# Patient Record
Sex: FEMALE | Race: WHITE | NOT HISPANIC OR LATINO | Employment: OTHER
[De-identification: names, ages, dates, MRNs, and addresses within clinical notes are randomized per-mention and may not be internally consistent; named-entity substitution may affect disease eponyms.]

---

## 2021-03-12 NOTE — PATIENT DISCUSSION
- Agree with starting [facility] OU. Provided with sample.  Has another sample at home

## 2021-10-08 ENCOUNTER — PREPPED CHART (OUTPATIENT)
Age: 71
End: 2021-10-08

## 2021-10-12 ENCOUNTER — ESTABLISHED COMPREHENSIVE EXAM (OUTPATIENT)
Age: 71
End: 2021-10-12

## 2021-10-12 DIAGNOSIS — H43.811: ICD-10-CM

## 2021-10-12 DIAGNOSIS — H25.813: ICD-10-CM

## 2021-10-12 PROCEDURE — 92014 COMPRE OPH EXAM EST PT 1/>: CPT

## 2021-10-12 PROCEDURE — 92015 DETERMINE REFRACTIVE STATE: CPT

## 2021-10-12 ASSESSMENT — VISUAL ACUITY
OS_CC: 20/20-2
OD_CC: 20/20

## 2021-10-12 ASSESSMENT — TONOMETRY
OD_IOP_MMHG: 16
OS_IOP_MMHG: 16

## 2022-01-13 NOTE — PATIENT DISCUSSION
Borderline visually significant. Patient elects to observe for now. Continue with same glasses for now.

## 2022-05-04 NOTE — PATIENT DISCUSSION
- HVF shows: mildly unreliable due to FLs. 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 Consider repeating in near future.

## 2022-07-09 ENCOUNTER — TELEPHONE ENCOUNTER (OUTPATIENT)
Dept: URBAN - METROPOLITAN AREA CLINIC 121 | Facility: CLINIC | Age: 72
End: 2022-07-09

## 2022-07-09 RX ORDER — BISMUTH SUBSALICYLATE 525 MG/1
TABLET ORAL
Refills: 0 | OUTPATIENT
Start: 2011-08-25 | End: 2013-07-19

## 2022-07-09 RX ORDER — OMEPRAZOLE 40 MG/1
CAPSULE, DELAYED RELEASE ORAL
Refills: 0 | OUTPATIENT
Start: 2011-08-25 | End: 2013-07-19

## 2022-07-09 RX ORDER — RALOXIFENE HCL 60 MG
TABLET ORAL
Refills: 0 | OUTPATIENT
Start: 2011-09-28 | End: 2013-07-19

## 2022-07-09 RX ORDER — OMEPRAZOLE 40 MG/1
CAPSULE, DELAYED RELEASE ORAL
Refills: 0 | OUTPATIENT
Start: 2013-06-02 | End: 2018-09-21

## 2022-07-09 RX ORDER — OMEGA-3S/DHA/EPA/FISH OIL 120-180-60
CAPSULE,DELAYED RELEASE (ENTERIC COATED) ORAL
Refills: 0 | OUTPATIENT
Start: 2011-08-25 | End: 2013-07-19

## 2022-07-10 ENCOUNTER — TELEPHONE ENCOUNTER (OUTPATIENT)
Dept: URBAN - METROPOLITAN AREA CLINIC 121 | Facility: CLINIC | Age: 72
End: 2022-07-10

## 2022-07-10 RX ORDER — BISMUTH SUBSALICYLATE 525 MG/1
TABLET ORAL
Refills: 0 | Status: ACTIVE | COMMUNITY
Start: 2013-07-19

## 2022-07-10 RX ORDER — OMEGA-3S/DHA/EPA/FISH OIL 120-180-60
CAPSULE,DELAYED RELEASE (ENTERIC COATED) ORAL
Refills: 0 | Status: ACTIVE | COMMUNITY
Start: 2013-07-19

## 2022-07-10 RX ORDER — FLUCONAZOLE 100 MG/1
TAKE TWO TABLETS ON FIRST DAY THEN DAILY FOR 9 DAYS TABLET ORAL
Refills: 0 | Status: ACTIVE | COMMUNITY
Start: 2018-12-13

## 2022-07-10 RX ORDER — GLUCOSAMINE/MSM/CHONDROIT SULF 500-166.6
TABLET ORAL
Refills: 0 | Status: ACTIVE | COMMUNITY
Start: 2018-09-21

## 2022-07-10 RX ORDER — CALCIUM CARBONATE/VITAMIN D3 600 MG-10
CAPSULE ORAL
Refills: 0 | Status: ACTIVE | COMMUNITY
Start: 2013-07-19

## 2022-07-10 RX ORDER — ANTIARTHRITIC COMBINATION NO.2 900 MG
TABLET ORAL
Refills: 0 | Status: ACTIVE | COMMUNITY
Start: 2013-07-19

## 2022-08-19 ENCOUNTER — LAB OUTSIDE AN ENCOUNTER (OUTPATIENT)
Dept: URBAN - METROPOLITAN AREA CLINIC 63 | Facility: CLINIC | Age: 72
End: 2022-08-19

## 2022-08-19 ENCOUNTER — OFFICE VISIT (OUTPATIENT)
Dept: URBAN - METROPOLITAN AREA CLINIC 63 | Facility: CLINIC | Age: 72
End: 2022-08-19
Payer: MEDICARE

## 2022-08-19 ENCOUNTER — WEB ENCOUNTER (OUTPATIENT)
Dept: URBAN - METROPOLITAN AREA CLINIC 63 | Facility: CLINIC | Age: 72
End: 2022-08-19

## 2022-08-19 VITALS
DIASTOLIC BLOOD PRESSURE: 60 MMHG | SYSTOLIC BLOOD PRESSURE: 110 MMHG | BODY MASS INDEX: 21.24 KG/M2 | HEIGHT: 65 IN | OXYGEN SATURATION: 96 % | HEART RATE: 62 BPM | TEMPERATURE: 98.4 F | WEIGHT: 127.5 LBS

## 2022-08-19 DIAGNOSIS — D64.9 NORMOCYTIC ANEMIA: ICD-10-CM

## 2022-08-19 DIAGNOSIS — K76.0 FATTY (CHANGE OF) LIVER: ICD-10-CM

## 2022-08-19 DIAGNOSIS — K80.20 CHOLELITHIASES: ICD-10-CM

## 2022-08-19 DIAGNOSIS — K63.5 COLON POLYPS: ICD-10-CM

## 2022-08-19 DIAGNOSIS — R07.9 CHEST PAIN: ICD-10-CM

## 2022-08-19 DIAGNOSIS — K21.9 GERD: ICD-10-CM

## 2022-08-19 PROCEDURE — 99204 OFFICE O/P NEW MOD 45 MIN: CPT | Performed by: INTERNAL MEDICINE

## 2022-08-19 RX ORDER — BISMUTH SUBSALICYLATE 525 MG/1
TABLET ORAL
Refills: 0 | Status: ACTIVE | COMMUNITY
Start: 2013-07-19

## 2022-08-19 RX ORDER — MULTIVIT-MIN/IRON/FOLIC ACID/K 18-600-40
AS DIRECTED CAPSULE ORAL
Status: ACTIVE | COMMUNITY

## 2022-08-19 RX ORDER — GLUCOSAMINE/MSM/CHONDROIT SULF 500-166.6
TABLET ORAL
Refills: 0 | Status: ACTIVE | COMMUNITY
Start: 2018-09-21

## 2022-08-19 RX ORDER — ANTIARTHRITIC COMBINATION NO.2 900 MG
TABLET ORAL
Refills: 0 | Status: ACTIVE | COMMUNITY
Start: 2013-07-19

## 2022-08-19 RX ORDER — OMEGA-3S/DHA/EPA/FISH OIL 120-180-60
CAPSULE,DELAYED RELEASE (ENTERIC COATED) ORAL
Refills: 0 | Status: ACTIVE | COMMUNITY
Start: 2013-07-19

## 2022-08-19 RX ORDER — CALCIUM CARBONATE/VITAMIN D3 600 MG-10
CAPSULE ORAL
Refills: 0 | Status: ACTIVE | COMMUNITY
Start: 2013-07-19

## 2022-08-19 RX ORDER — FAMOTIDINE 40 MG/1
1 TABLET AT BEDTIME TABLET, FILM COATED ORAL TWICE A DAY
Status: ACTIVE | COMMUNITY

## 2022-08-19 NOTE — PHYSICAL EXAM GASTROINTESTINAL
soft, nontender, nondistended , no masses palpable , normal bowel sounds , mild tenderness ruq / no hepatomegaly

## 2022-08-19 NOTE — HPI-TODAY'S VISIT:
Sera is here today in the office for evaluation of right-sided chest pain.  She has a history of chronic reflux and has been taking ranitidine in the past and now switched to famotidine 40 mg.  Since July she has been experience some right-sided chest pain.  She had a mammogram done that was unremarkable.  Her primary care physician Dr. Tiara Castano placed her on pantoprazole 20 mg which did not relieve her symptoms.  She exercises on a regular basis.  Denies use of NSAIDs.  No rectal bleeding or melena. Symptoms are precipitated following a meal, associated with burping gas and sometimes flatulence.  She has regular bowel movements on align.  Weight has been stable.  No loss of appetite. Her ultrasound in 2011 noted hepatomegaly and gallstones.

## 2022-08-19 NOTE — HPI-PREVIOUS LABS
Labs July 2022: WBC 4.3, hemoglobin 12.9 g, MCV 91, platelets 225, BUN 15 creatinine 0.62, normal electrolytes, serum albumin 4.4, normal liver enzymes, hemoglobin A1c 5.6, normal lipid panel, TSH is normal, vitamin D 96, Labs 2018: Normal iron B12 folate levels, hemoglobin 11.3 g MCV 89, platelets 354

## 2022-08-19 NOTE — HPI-PREVIOUS PROCEDURES
Upper endoscopy 2018: Endoscopic suggestion of short segment Quezada's esophagus with biopsies negative for Quezada's, findings also suspicious for EOE-biopsies came back showing Gia, 3 cm hiatal hernia, normal stomach, normal duodenum 2 colonoscopies in 2018-initial 1 had poor prep with magnesium citrate and subsequently had another colonoscopy with Dr. Weaver-6 mm transverse colon adenoma, hyperplastic rectal polyp, left-sided diverticulosis and grade 2 hemorrhoids  EGD 2011: Mild esophagitis biopsies negative for Quezada's, H. pylori negative gastritis, 2 cm hiatal hernia, unremarkable duodenal biopsies Colonoscopy 2011: Tortuous colon, mild sigmoid diverticulosis and hemorrhoids

## 2022-08-25 ENCOUNTER — OFFICE VISIT (OUTPATIENT)
Dept: URBAN - METROPOLITAN AREA SURGERY CENTER 4 | Facility: SURGERY CENTER | Age: 72
End: 2022-08-25
Payer: MEDICARE

## 2022-08-25 ENCOUNTER — CLAIMS CREATED FROM THE CLAIM WINDOW (OUTPATIENT)
Dept: URBAN - METROPOLITAN AREA CLINIC 4 | Facility: CLINIC | Age: 72
End: 2022-08-25
Payer: MEDICARE

## 2022-08-25 DIAGNOSIS — K21.9 GASTRO-ESOPHAGEAL REFLUX DISEASE WITHOUT ESOPHAGITIS: ICD-10-CM

## 2022-08-25 DIAGNOSIS — K21.9 GERD: ICD-10-CM

## 2022-08-25 DIAGNOSIS — R19.8 OTHER SPECIFIED SYMPTOMS AND SIGNS INVOLVING THE DIGESTIVE SYSTEM AND ABDOMEN: ICD-10-CM

## 2022-08-25 DIAGNOSIS — K44.9 DIAPHRAGMATIC HERNIA WITHOUT OBSTRUCTION OR GANGRENE: ICD-10-CM

## 2022-08-25 DIAGNOSIS — K22.70 BARRETT ESOPHAGUS: ICD-10-CM

## 2022-08-25 PROCEDURE — 88305 TISSUE EXAM BY PATHOLOGIST: CPT | Performed by: PATHOLOGY

## 2022-08-25 PROCEDURE — G8907 PT DOC NO EVENTS ON DISCHARG: HCPCS | Performed by: INTERNAL MEDICINE

## 2022-08-25 PROCEDURE — 43239 EGD BIOPSY SINGLE/MULTIPLE: CPT | Performed by: INTERNAL MEDICINE

## 2022-08-25 PROCEDURE — G8918 PT W/O PREOP ORDER IV AB PRO: HCPCS | Performed by: INTERNAL MEDICINE

## 2022-08-25 RX ORDER — FAMOTIDINE 40 MG/1
1 TABLET AT BEDTIME TABLET, FILM COATED ORAL TWICE A DAY
Status: ACTIVE | COMMUNITY

## 2022-08-25 RX ORDER — CALCIUM CARBONATE/VITAMIN D3 600 MG-10
CAPSULE ORAL
Refills: 0 | Status: ACTIVE | COMMUNITY
Start: 2013-07-19

## 2022-08-25 RX ORDER — BISMUTH SUBSALICYLATE 525 MG/1
TABLET ORAL
Refills: 0 | Status: ACTIVE | COMMUNITY
Start: 2013-07-19

## 2022-08-25 RX ORDER — ANTIARTHRITIC COMBINATION NO.2 900 MG
TABLET ORAL
Refills: 0 | Status: ACTIVE | COMMUNITY
Start: 2013-07-19

## 2022-08-25 RX ORDER — MULTIVIT-MIN/IRON/FOLIC ACID/K 18-600-40
AS DIRECTED CAPSULE ORAL
Status: ACTIVE | COMMUNITY

## 2022-08-25 RX ORDER — OMEGA-3S/DHA/EPA/FISH OIL 120-180-60
CAPSULE,DELAYED RELEASE (ENTERIC COATED) ORAL
Refills: 0 | Status: ACTIVE | COMMUNITY
Start: 2013-07-19

## 2022-08-25 RX ORDER — GLUCOSAMINE/MSM/CHONDROIT SULF 500-166.6
TABLET ORAL
Refills: 0 | Status: ACTIVE | COMMUNITY
Start: 2018-09-21

## 2022-08-26 ENCOUNTER — LAB OUTSIDE AN ENCOUNTER (OUTPATIENT)
Dept: URBAN - METROPOLITAN AREA CLINIC 63 | Facility: CLINIC | Age: 72
End: 2022-08-26

## 2022-08-26 ENCOUNTER — WEB ENCOUNTER (OUTPATIENT)
Dept: URBAN - METROPOLITAN AREA CLINIC 63 | Facility: CLINIC | Age: 72
End: 2022-08-26

## 2022-08-26 PROBLEM — 302914006 BARRETT ESOPHAGUS: Status: ACTIVE | Noted: 2022-08-26

## 2022-08-26 RX ORDER — OMEPRAZOLE 40 MG/1
1 CAPSULE 30 MINUTES BEFORE MORNING MEAL CAPSULE, DELAYED RELEASE ORAL ONCE A DAY
Qty: 90 | Refills: 1 | OUTPATIENT
Start: 2022-08-26

## 2022-09-08 ENCOUNTER — OFFICE VISIT (OUTPATIENT)
Dept: URBAN - METROPOLITAN AREA TELEHEALTH 1 | Facility: TELEHEALTH | Age: 72
End: 2022-09-08
Payer: MEDICARE

## 2022-09-08 DIAGNOSIS — R07.9 CHEST PAIN: ICD-10-CM

## 2022-09-08 DIAGNOSIS — K21.9 GERD: ICD-10-CM

## 2022-09-08 DIAGNOSIS — K63.5 COLON POLYPS: ICD-10-CM

## 2022-09-08 DIAGNOSIS — K76.0 FATTY (CHANGE OF) LIVER: ICD-10-CM

## 2022-09-08 DIAGNOSIS — K80.20 CHOLELITHIASES: ICD-10-CM

## 2022-09-08 DIAGNOSIS — D64.9 NORMOCYTIC ANEMIA: ICD-10-CM

## 2022-09-08 PROBLEM — 266474003 CHOLELITHIASIS: Status: ACTIVE | Noted: 2022-08-19

## 2022-09-08 PROBLEM — 197321007 FATTY LIVER: Status: ACTIVE | Noted: 2022-08-19

## 2022-09-08 PROBLEM — 235595009 GASTROESOPHAGEAL REFLUX DISEASE: Status: ACTIVE | Noted: 2022-08-19

## 2022-09-08 PROCEDURE — 99213 OFFICE O/P EST LOW 20 MIN: CPT | Performed by: INTERNAL MEDICINE

## 2022-09-08 RX ORDER — FAMOTIDINE 40 MG/1
1 TABLET AT BEDTIME TABLET, FILM COATED ORAL TWICE A DAY
Status: ACTIVE | COMMUNITY

## 2022-09-08 RX ORDER — CALCIUM CARBONATE/VITAMIN D3 600 MG-10
CAPSULE ORAL
Refills: 0 | Status: ACTIVE | COMMUNITY
Start: 2013-07-19

## 2022-09-08 RX ORDER — OMEPRAZOLE 40 MG/1
1 CAPSULE 30 MINUTES BEFORE MORNING MEAL CAPSULE, DELAYED RELEASE ORAL ONCE A DAY
Qty: 90 | Refills: 1 | Status: ACTIVE | COMMUNITY
Start: 2022-08-26

## 2022-09-08 RX ORDER — BISMUTH SUBSALICYLATE 525 MG/1
TABLET ORAL
Refills: 0 | Status: ACTIVE | COMMUNITY
Start: 2013-07-19

## 2022-09-08 RX ORDER — GLUCOSAMINE/MSM/CHONDROIT SULF 500-166.6
TABLET ORAL
Refills: 0 | Status: ACTIVE | COMMUNITY
Start: 2018-09-21

## 2022-09-08 RX ORDER — MULTIVIT-MIN/IRON/FOLIC ACID/K 18-600-40
AS DIRECTED CAPSULE ORAL
Status: ACTIVE | COMMUNITY

## 2022-09-08 RX ORDER — ANTIARTHRITIC COMBINATION NO.2 900 MG
TABLET ORAL
Refills: 0 | Status: ACTIVE | COMMUNITY
Start: 2013-07-19

## 2022-09-08 RX ORDER — OMEGA-3S/DHA/EPA/FISH OIL 120-180-60
CAPSULE,DELAYED RELEASE (ENTERIC COATED) ORAL
Refills: 0 | Status: ACTIVE | COMMUNITY
Start: 2013-07-19

## 2022-09-08 NOTE — HPI-PREVIOUS IMAGING
Ultrasound August 2022: Gallstones present, normal ducts, normal liver (homogeneous echogenicity), no focal lesions, normal spleen Ultrasound 2011: Mild hepatomegaly, cholelithiasis, normal ducts,

## 2022-09-08 NOTE — HPI-TODAY'S VISIT:
Sera is being seen today on telehealth following her upper endoscopy that was done for acid reflux and chest pain.  She is currently taking omeprazole 40 mg in the morning and famotidine 40 mg at bedtime.  This seems to be helping her acid reflux and chest pain symptoms significantly.  She reports no dysphagia abdominal pain or nausea.  Denies any early satiety or bloating.  Denies any constipation diarrhea rectal bleeding or melena.  Denies any unintentional weight loss loss of appetite.  She exercises on a regular basis.  Denies use of NSAIDs.   Her colonoscopy is due in 2023. Ultrasound noted gallstones but otherwise unremarkable-done in August 2022.

## 2022-09-08 NOTE — HPI-PREVIOUS PROCEDURES
Upper endoscopy August 2022: LA grade a esophagitis-C1 M2 but pathology showed no evidence of eosinophilic esophagitis or Quezada's.  3 cm hiatal hernia.  Nonobstructing Schatzki's ring.  Normal stomach and normal duodenum-no biopsies taken in the stomach or duodenum.  Upper endoscopy 2018: Endoscopic suggestion of short segment Quezada's esophagus with biopsies negative for Quezada's, findings also suspicious for EOE-biopsies came back showing Gia, 3 cm hiatal hernia, normal stomach, normal duodenum 2 colonoscopies in 2018-initial 1 had poor prep with magnesium citrate and subsequently had another colonoscopy with Dr. Weaver-6 mm transverse colon adenoma, hyperplastic rectal polyp, left-sided diverticulosis and grade 2 hemorrhoids  EGD 2011: Mild esophagitis biopsies negative for Quezada's, H. pylori negative gastritis, 2 cm hiatal hernia, unremarkable duodenal biopsies Colonoscopy 2011: Tortuous colon, mild sigmoid diverticulosis and hemorrhoids

## 2022-12-12 NOTE — PATIENT DISCUSSION
HVF shows: mildly unreliable due to FLs. 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 Consider repeating in near future.

## 2022-12-28 ENCOUNTER — COMPREHENSIVE EXAM (OUTPATIENT)
Dept: URBAN - METROPOLITAN AREA CLINIC 27 | Facility: CLINIC | Age: 72
End: 2022-12-28

## 2022-12-28 PROCEDURE — 92015 DETERMINE REFRACTIVE STATE: CPT

## 2022-12-28 PROCEDURE — 92014 COMPRE OPH EXAM EST PT 1/>: CPT

## 2022-12-28 ASSESSMENT — TONOMETRY
OS_IOP_MMHG: 16
OD_IOP_MMHG: 14

## 2022-12-28 ASSESSMENT — VISUAL ACUITY
OS_CC: 20/20
OD_CC: 20/20-2
OD_CC: J3-1
OS_CC: J2-1

## 2022-12-28 ASSESSMENT — KERATOMETRY
OS_AXISANGLE2_DEGREES: 95
OD_AXISANGLE2_DEGREES: 92
OD_K2POWER_DIOPTERS: 44.50
OS_K2POWER_DIOPTERS: 44.75
OS_AXISANGLE_DEGREES: 5
OS_K1POWER_DIOPTERS: 44.25
OD_K1POWER_DIOPTERS: 44.00
OD_AXISANGLE_DEGREES: 2

## 2023-02-10 ENCOUNTER — ERX REFILL RESPONSE (OUTPATIENT)
Dept: URBAN - METROPOLITAN AREA CLINIC 63 | Facility: CLINIC | Age: 73
End: 2023-02-10

## 2023-02-10 RX ORDER — OMEPRAZOLE 40 MG/1
TAKE 1 CAPSULE BY MOUTH EVERY DAY 30 MINUTES BEFORE MORNING MEAL FOR 90 DAYS CAPSULE, DELAYED RELEASE ORAL
Qty: 90 CAPSULE | Refills: 0 | OUTPATIENT

## 2023-02-10 RX ORDER — OMEPRAZOLE 40 MG/1
1 CAPSULE 30 MINUTES BEFORE MORNING MEAL CAPSULE, DELAYED RELEASE ORAL ONCE A DAY
Qty: 90 | Refills: 1 | OUTPATIENT

## 2023-05-17 ENCOUNTER — WEB ENCOUNTER (OUTPATIENT)
Dept: URBAN - METROPOLITAN AREA CLINIC 63 | Facility: CLINIC | Age: 73
End: 2023-05-17

## 2023-05-17 RX ORDER — OMEPRAZOLE 40 MG/1
TAKE 1 CAPSULE BY MOUTH EVERY DAY 30 MINUTES BEFORE MORNING MEAL FOR 90 DAYS CAPSULE, DELAYED RELEASE ORAL ONCE A DAY
Qty: 90 CAPSULE | Refills: 1

## 2023-09-01 ENCOUNTER — WEB ENCOUNTER (OUTPATIENT)
Dept: URBAN - METROPOLITAN AREA CLINIC 63 | Facility: CLINIC | Age: 73
End: 2023-09-01

## 2023-09-07 ENCOUNTER — OFFICE VISIT (OUTPATIENT)
Dept: URBAN - METROPOLITAN AREA CLINIC 63 | Facility: CLINIC | Age: 73
End: 2023-09-07
Payer: MEDICARE

## 2023-09-07 VITALS
WEIGHT: 118.2 LBS | BODY MASS INDEX: 19.69 KG/M2 | DIASTOLIC BLOOD PRESSURE: 84 MMHG | TEMPERATURE: 97.4 F | HEART RATE: 65 BPM | SYSTOLIC BLOOD PRESSURE: 128 MMHG | HEIGHT: 65 IN | OXYGEN SATURATION: 97 %

## 2023-09-07 DIAGNOSIS — K63.5 COLON POLYPS: ICD-10-CM

## 2023-09-07 DIAGNOSIS — K80.20 CHOLELITHIASES: ICD-10-CM

## 2023-09-07 DIAGNOSIS — R07.9 CHEST PAIN: ICD-10-CM

## 2023-09-07 DIAGNOSIS — K76.0 FATTY (CHANGE OF) LIVER: ICD-10-CM

## 2023-09-07 DIAGNOSIS — K21.9 GERD: ICD-10-CM

## 2023-09-07 DIAGNOSIS — D64.9 NORMOCYTIC ANEMIA: ICD-10-CM

## 2023-09-07 PROCEDURE — 99214 OFFICE O/P EST MOD 30 MIN: CPT | Performed by: INTERNAL MEDICINE

## 2023-09-07 RX ORDER — OMEPRAZOLE 40 MG/1
TAKE 1 CAPSULE BY MOUTH EVERY DAY 30 MINUTES BEFORE MORNING MEAL FOR 90 DAYS CAPSULE, DELAYED RELEASE ORAL ONCE A DAY
Qty: 90 CAPSULE | Refills: 1 | Status: ACTIVE | COMMUNITY

## 2023-09-07 RX ORDER — BISMUTH SUBSALICYLATE 525 MG/1
TABLET ORAL
Refills: 0 | Status: ACTIVE | COMMUNITY
Start: 2013-07-19

## 2023-09-07 RX ORDER — CALCIUM CARBONATE/VITAMIN D3 600 MG-10
CAPSULE ORAL
Refills: 0 | Status: ACTIVE | COMMUNITY
Start: 2013-07-19

## 2023-09-07 RX ORDER — ANTIARTHRITIC COMBINATION NO.2 900 MG
TABLET ORAL
Refills: 0 | Status: ACTIVE | COMMUNITY
Start: 2013-07-19

## 2023-09-07 RX ORDER — GLUCOSAMINE/MSM/CHONDROIT SULF 500-166.6
TABLET ORAL
Refills: 0 | Status: ACTIVE | COMMUNITY
Start: 2018-09-21

## 2023-09-07 RX ORDER — MULTIVIT-MIN/IRON/FOLIC ACID/K 18-600-40
AS DIRECTED CAPSULE ORAL
Status: ACTIVE | COMMUNITY

## 2023-09-07 RX ORDER — OMEGA-3S/DHA/EPA/FISH OIL 120-180-60
CAPSULE,DELAYED RELEASE (ENTERIC COATED) ORAL
Refills: 0 | Status: ACTIVE | COMMUNITY
Start: 2013-07-19

## 2023-09-07 NOTE — HPI-PREVIOUS PROCEDURES
Upper endoscopy August 2022: LA grade a esophagitis-C1 M2 but pathology showed no evidence of eosinophilic esophagitis or Quezada's.  3 cm hiatal hernia.  Nonobstructing Schatzki's ring.  Normal stomach and normal duodenum-no biopsies taken in the stomach or duodenum.  Upper endoscopy 2018: Endoscopic suggestion of short segment Quezada's esophagus with biopsies negative for Quezada's, findings also suspicious for EOE-biopsies came back showing Gia, 3 cm hiatal hernia, normal stomach, normal duodenum 2 colonoscopies in 2018-initial 1 had poor prep with magnesium citrate and subsequently had another colonoscopy with Dr. Rizzo-6 mm transverse colon adenoma, hyperplastic rectal polyp, left-sided diverticulosis and grade 2 hemorrhoids  EGD 2011: Mild esophagitis biopsies negative for Quezada's, H. pylori negative gastritis, 2 cm hiatal hernia, unremarkable duodenal biopsies colonoscopy 2018 - dr rizzo small transverse colon adenoma , diverticulosis, hemorrhoids Colonoscopy 2011: Tortuous colon, mild sigmoid diverticulosis and hemorrhoids

## 2023-09-07 NOTE — HPI-TODAY'S VISIT:
Sera is here today in follow-up. Since her last visit in 2022 she underwent a cholecystectomy this may after her gallbladder ultrasound noted signs of cholecystitis apparently per her report.  She is doing well after her cholecystectomy. She denies any significant reflux.  She takes omeprazole 40 mg and famotidine 40 mg.  I recommended she talk to stop omeprazole 40 mg and continue with famotidine.  She denies any dysphagia.  Reports regular bowel movements although she has small bowel movements during the day after her regular morning bowel movement.  She denies any rectal bleeding or melena.  She takes align. Last colonoscopy was done by Dr. Weaver in 2018 and she had a diminutive transverse colon adenoma.  Colonoscopy was described as being difficult due to restrictive colon mobility..

## 2023-09-13 ENCOUNTER — WEB ENCOUNTER (OUTPATIENT)
Dept: URBAN - METROPOLITAN AREA CLINIC 63 | Facility: CLINIC | Age: 73
End: 2023-09-13

## 2023-11-26 ENCOUNTER — ERX REFILL RESPONSE (OUTPATIENT)
Dept: URBAN - METROPOLITAN AREA CLINIC 63 | Facility: CLINIC | Age: 73
End: 2023-11-26

## 2023-11-26 RX ORDER — OMEPRAZOLE 40 MG/1
TAKE 1 CAPSULE BY MOUTH EVERY DAY 30 MINUTES BEFORE MORNING MEAL FOR 90 DAYS ORALLY ONCE A DAY 90 DAYS CAPSULE, DELAYED RELEASE ORAL
Qty: 90 CAPSULE | Refills: 1 | OUTPATIENT

## 2023-11-26 RX ORDER — OMEPRAZOLE 40 MG/1
TAKE 1 CAPSULE BY MOUTH EVERY DAY 30 MINUTES BEFORE MORNING MEAL FOR 90 DAYS CAPSULE, DELAYED RELEASE ORAL ONCE A DAY
Qty: 90 CAPSULE | Refills: 1 | OUTPATIENT

## 2023-12-14 ENCOUNTER — OFFICE VISIT (OUTPATIENT)
Dept: URBAN - METROPOLITAN AREA CLINIC 63 | Facility: CLINIC | Age: 73
End: 2023-12-14

## 2023-12-14 ENCOUNTER — OFFICE VISIT (OUTPATIENT)
Dept: URBAN - METROPOLITAN AREA CLINIC 63 | Facility: CLINIC | Age: 73
End: 2023-12-14
Payer: MEDICARE

## 2023-12-14 ENCOUNTER — DASHBOARD ENCOUNTERS (OUTPATIENT)
Age: 73
End: 2023-12-14

## 2023-12-14 VITALS
DIASTOLIC BLOOD PRESSURE: 80 MMHG | SYSTOLIC BLOOD PRESSURE: 120 MMHG | HEIGHT: 65 IN | HEART RATE: 65 BPM | BODY MASS INDEX: 20.16 KG/M2 | WEIGHT: 121 LBS | TEMPERATURE: 97.5 F | OXYGEN SATURATION: 97 %

## 2023-12-14 DIAGNOSIS — K63.5 COLON POLYPS: ICD-10-CM

## 2023-12-14 DIAGNOSIS — K21.9 GERD: ICD-10-CM

## 2023-12-14 DIAGNOSIS — K76.0 FATTY (CHANGE OF) LIVER: ICD-10-CM

## 2023-12-14 DIAGNOSIS — R07.9 CHEST PAIN: ICD-10-CM

## 2023-12-14 PROCEDURE — 99214 OFFICE O/P EST MOD 30 MIN: CPT | Performed by: INTERNAL MEDICINE

## 2023-12-14 RX ORDER — MULTIVIT-MIN/IRON/FOLIC ACID/K 18-600-40
AS DIRECTED CAPSULE ORAL
Status: ACTIVE | COMMUNITY

## 2023-12-14 RX ORDER — GLUCOSAMINE/MSM/CHONDROIT SULF 500-166.6
TABLET ORAL
Refills: 0 | Status: ACTIVE | COMMUNITY
Start: 2018-09-21

## 2023-12-14 RX ORDER — BISMUTH SUBSALICYLATE 525 MG/1
TABLET ORAL
Refills: 0 | Status: ACTIVE | COMMUNITY
Start: 2013-07-19

## 2023-12-14 RX ORDER — CALCIUM CARBONATE/VITAMIN D3 600 MG-10
CAPSULE ORAL
Refills: 0 | Status: ACTIVE | COMMUNITY
Start: 2013-07-19

## 2023-12-14 RX ORDER — OMEGA-3S/DHA/EPA/FISH OIL 120-180-60
CAPSULE,DELAYED RELEASE (ENTERIC COATED) ORAL
Refills: 0 | Status: ACTIVE | COMMUNITY
Start: 2013-07-19

## 2023-12-14 RX ORDER — ANTIARTHRITIC COMBINATION NO.2 900 MG
TABLET ORAL
Refills: 0 | Status: ACTIVE | COMMUNITY
Start: 2013-07-19

## 2023-12-14 NOTE — HPI-TODAY'S VISIT:
Sera is here today in follow-up. She remains asymptomatic. She reports no reflux and wants to see if she can still needs to take omeprazole and famotidine. She stopped both her medicines. I recommended she go back on famotidine due to her history of hiatal hernia. Cologuard test ordered in September 2023 came back negative. Colonoscopy in 2018 did not show any significant findings. She is asymptomatic. Denies reflux symptoms or dysphagia. Reports no abdominal pain or nausea. Denies constipation diarrhea rectal bleeding or melena and denies any unintentional weight loss loss of appetite..   Since her last visit in 2022 she underwent a cholecystectomy this may (2023) after her gallbladder ultrasound noted signs of cholecystitis apparently per her report.  She is doing well after her cholecystectomy. She takes align. Last colonoscopy was done by Dr. Weaver in 2018 and she had a diminutive transverse colon adenoma.  Colonoscopy was described as being difficult due to restrictive colon mobility..

## 2023-12-14 NOTE — HPI-PREVIOUS PROCEDURES
COLOGUARD SEPT 2023 NEG  Upper endoscopy August 2022: LA grade a esophagitis-C1 M2 but pathology showed no evidence of eosinophilic esophagitis or Quezada's.  3 cm hiatal hernia.  Nonobstructing Schatzki's ring.  Normal stomach and normal duodenum-no biopsies taken in the stomach or duodenum.  Upper endoscopy 2018: Endoscopic suggestion of short segment Quezada's esophagus with biopsies negative for Quezada's, findings also suspicious for EOE-biopsies came back showing Gia, 3 cm hiatal hernia, normal stomach, normal duodenum 2 colonoscopies in 2018-initial 1 had poor prep with magnesium citrate and subsequently had another colonoscopy with Dr. Rizzo-6 mm transverse colon adenoma, hyperplastic rectal polyp, left-sided diverticulosis and grade 2 hemorrhoids  EGD 2011: Mild esophagitis biopsies negative for Quezada's, H. pylori negative gastritis, 2 cm hiatal hernia, unremarkable duodenal biopsies colonoscopy 2018 - dr rizzo small transverse colon adenoma , diverticulosis, hemorrhoids Colonoscopy 2011: Tortuous colon, mild sigmoid diverticulosis and hemorrhoids

## 2024-01-18 ENCOUNTER — COMPREHENSIVE EXAM (OUTPATIENT)
Dept: URBAN - METROPOLITAN AREA CLINIC 27 | Facility: CLINIC | Age: 74
End: 2024-01-18

## 2024-01-18 DIAGNOSIS — H43.811: ICD-10-CM

## 2024-01-18 DIAGNOSIS — H25.813: ICD-10-CM

## 2024-01-18 DIAGNOSIS — H52.203: ICD-10-CM

## 2024-01-18 DIAGNOSIS — H04.123: ICD-10-CM

## 2024-01-18 PROCEDURE — 99214 OFFICE O/P EST MOD 30 MIN: CPT

## 2024-01-18 PROCEDURE — 92015 DETERMINE REFRACTIVE STATE: CPT

## 2024-01-18 ASSESSMENT — KERATOMETRY
OD_AXISANGLE_DEGREES: 2
OS_K1POWER_DIOPTERS: 44.25
OS_AXISANGLE2_DEGREES: 95
OD_K1POWER_DIOPTERS: 44.00
OD_K2POWER_DIOPTERS: 44.50
OD_AXISANGLE2_DEGREES: 92
OS_AXISANGLE_DEGREES: 5
OS_K2POWER_DIOPTERS: 44.75

## 2024-01-18 ASSESSMENT — VISUAL ACUITY
OD_CC: 20/30-1
OS_CC: 20/30
OU_CC: J1+

## 2024-01-18 ASSESSMENT — TONOMETRY
OS_IOP_MMHG: 15
OD_IOP_MMHG: 13

## 2024-06-13 ENCOUNTER — WEB ENCOUNTER (OUTPATIENT)
Dept: URBAN - METROPOLITAN AREA CLINIC 63 | Facility: CLINIC | Age: 74
End: 2024-06-13

## 2024-06-13 ENCOUNTER — OFFICE VISIT (OUTPATIENT)
Dept: URBAN - METROPOLITAN AREA CLINIC 63 | Facility: CLINIC | Age: 74
End: 2024-06-13
Payer: MEDICARE

## 2024-06-13 VITALS
BODY MASS INDEX: 19.73 KG/M2 | HEART RATE: 61 BPM | WEIGHT: 118.4 LBS | DIASTOLIC BLOOD PRESSURE: 80 MMHG | HEIGHT: 65 IN | TEMPERATURE: 97.8 F | SYSTOLIC BLOOD PRESSURE: 110 MMHG | OXYGEN SATURATION: 97 %

## 2024-06-13 DIAGNOSIS — K63.5 COLON POLYPS: ICD-10-CM

## 2024-06-13 DIAGNOSIS — D64.9 NORMOCYTIC ANEMIA: ICD-10-CM

## 2024-06-13 DIAGNOSIS — R19.8 IRREGULAR BOWEL HABITS: ICD-10-CM

## 2024-06-13 DIAGNOSIS — K21.9 GERD: ICD-10-CM

## 2024-06-13 PROCEDURE — 99213 OFFICE O/P EST LOW 20 MIN: CPT

## 2024-06-13 RX ORDER — FAMOTIDINE 40 MG/1
TABLET, FILM COATED ORAL
Qty: 90 TABLET | Status: ACTIVE | COMMUNITY

## 2024-06-13 RX ORDER — MULTIVIT-MIN/FERROUS GLUCONATE 9 MG/15 ML
AS DIRECTED LIQUID (ML) ORAL
Status: ACTIVE | COMMUNITY

## 2024-06-13 RX ORDER — HYDROCODONE BITARTRATE AND ACETAMINOPHEN 5; 325 MG/1; MG/1
TABLET ORAL
Qty: 12 TABLET | COMMUNITY

## 2024-06-13 NOTE — HPI-TODAY'S VISIT:
Patient is a 72yo female with past medical history of esophagitis, HH, colon polyps, cholecystectomy, fatty liver and GERD. Sera is here referred by PCP for anemia seen on most recent labs. History of normocytic anemia with latest available hgb 12 per PCP records (done 4/2024). Iron and ferritin WNL. FOBT negative. She remains asymptomatic and denies any signs of GI bleeding. Cologuard test September 2023 came back negative. Last colonoscopy was done by Dr. Weaver in 2018 and she had a diminutive transverse colon adenoma. Colonoscopy was described as being difficult due to restrictive colon mobility. 5 year recall given.    History of reflux well managed on famotidine 40 mg once daily with very occasional breakthrough sx.  She states she takes Align every night at bedtime and has a good BM in the morning after her coffee but at times feels later in the day she has to go again and will not be able to or passes a few mehreen. Denies reflux symptoms or dysphagia. Reports no abdominal pain or nausea. Denies constipation diarrhea rectal bleeding or melena and denies any unintentional weight loss loss of appetite.

## 2024-06-14 ENCOUNTER — WEB ENCOUNTER (OUTPATIENT)
Dept: URBAN - METROPOLITAN AREA CLINIC 63 | Facility: CLINIC | Age: 74
End: 2024-06-14

## 2024-06-16 ENCOUNTER — WEB ENCOUNTER (OUTPATIENT)
Dept: URBAN - METROPOLITAN AREA CLINIC 63 | Facility: CLINIC | Age: 74
End: 2024-06-16

## 2024-06-17 ENCOUNTER — WEB ENCOUNTER (OUTPATIENT)
Dept: URBAN - METROPOLITAN AREA CLINIC 63 | Facility: CLINIC | Age: 74
End: 2024-06-17

## 2025-01-20 ENCOUNTER — COMPREHENSIVE EXAM (OUTPATIENT)
Age: 75
End: 2025-01-20

## 2025-01-20 DIAGNOSIS — H43.811: ICD-10-CM

## 2025-01-20 DIAGNOSIS — H25.813: ICD-10-CM

## 2025-01-20 DIAGNOSIS — H52.223: ICD-10-CM

## 2025-01-20 DIAGNOSIS — H04.123: ICD-10-CM

## 2025-01-20 PROCEDURE — 99214 OFFICE O/P EST MOD 30 MIN: CPT

## 2025-01-20 PROCEDURE — 92015 DETERMINE REFRACTIVE STATE: CPT

## 2025-04-04 ENCOUNTER — OFFICE VISIT (OUTPATIENT)
Dept: URBAN - METROPOLITAN AREA CLINIC 63 | Facility: CLINIC | Age: 75
End: 2025-04-04
Payer: MEDICARE

## 2025-04-04 DIAGNOSIS — K63.5 COLON POLYPS: ICD-10-CM

## 2025-04-04 DIAGNOSIS — R19.8 IRREGULAR BOWEL HABITS: ICD-10-CM

## 2025-04-04 DIAGNOSIS — K21.9 GERD: ICD-10-CM

## 2025-04-04 DIAGNOSIS — D64.9 NORMOCYTIC ANEMIA: ICD-10-CM

## 2025-04-04 PROCEDURE — 99213 OFFICE O/P EST LOW 20 MIN: CPT | Performed by: INTERNAL MEDICINE

## 2025-04-04 RX ORDER — FAMOTIDINE 40 MG/1
1 TABLET AT BEDTIME TABLET, FILM COATED ORAL ONCE A DAY
Qty: 30 | Refills: 5 | OUTPATIENT
Start: 2025-04-04

## 2025-04-04 RX ORDER — MULTIVIT-MIN/FERROUS GLUCONATE 9 MG/15 ML
AS DIRECTED LIQUID (ML) ORAL
Status: ACTIVE | COMMUNITY

## 2025-04-04 RX ORDER — GLUCOSAMINE SULFATE 500 MG
1 CAPSULE WITH MEALS CAPSULE ORAL THREE TIMES A DAY
Status: ACTIVE | COMMUNITY

## 2025-04-04 RX ORDER — ALUMINUM ZIRCONIUM OCTACHLOROHYDREX GLY 16 G/100G
AS DIRECTED GEL TOPICAL
Status: ACTIVE | COMMUNITY

## 2025-04-04 RX ORDER — ACETAMINOPHEN 325 MG
1 CAPSULE TABLET ORAL ONCE A DAY
Status: ACTIVE | COMMUNITY

## 2025-04-04 NOTE — HPI-TODAY'S VISIT:
Patient is a 72yo female with past medical history of esophagitis, HH, colon polyps, cholecystectomy, fatty liver and GERD. Sera is here referred by PCP for anemia seen on most recent labs. History of normocytic anemia with latest available hgb 12 per PCP records (done 4/2024). Iron and ferritin WNL. FOBT negative. She remains asymptomatic and denies any signs of GI bleeding. Cologuard test September 2023 came back negative. Last colonoscopy was done by Dr. Weaver in 2018 and she had a diminutive transverse colon adenoma. Colonoscopy was described as being difficult due to restrictive colon mobility. 5 year recall given.    Sera is here today in follow-up She seems to be doing well from a GI standpoint.  Endoscopy in the past have noted a 3 cm hiatal hernia.  She was placed on 40 mg of famotidine.  Since her cholecystectomy she reports that her upper gastrointestinal complaints have improved significantly and feels that she wants to come off the famotidine.  Every once in a while she feels that she has slow digestion and unsatisfactory bowel movements requiring multiple attempts prior to feeling completely evacuated. He she is currently not on any fiber supplements or laxatives. Last colonoscopy was done in 2018 and since then she has had a negative Cologuard in 2023 She denies any rectal bleeding melena unintentional weight loss or loss of appetite denies dysphagia early satiety etc.   LAST OFFICE VISIT : MR JUN 2024  History of reflux well managed on famotidine 40 mg once daily with very occasional breakthrough sx. She states she takes Align every night at bedtime and has a good BM in the morning after her coffee but at times feels later in the day she has to go again and will not be able to or passes a few mehreen. Denies reflux symptoms or dysphagia. Reports no abdominal pain or nausea. Denies constipation diarrhea rectal bleeding or melena and denies any unintentional weight loss loss of appetite. Sera is here today in follow-up. She remains asymptomatic. She reports no reflux and wants to see if she can still needs to take omeprazole and famotidine. She stopped both her medicines. I recommended she go back on famotidine due to her history of hiatal hernia. Cologuard test ordered in September 2023 came back negative. Colonoscopy in 2018 did not show any significant findings. She is asymptomatic. Denies reflux symptoms or dysphagia. Reports no abdominal pain or nausea. Denies constipation diarrhea rectal bleeding or melena and denies any unintentional weight loss loss of appetite.. Since her last visit in 2022 she underwent a cholecystectomy this may (2023) after her gallbladder ultrasound noted signs of cholecystitis apparently per her report.  She is doing well after her cholecystectomy. She takes align. Last colonoscopy was done by Dr. Weaver in 2018 and she had a diminutive transverse colon adenoma.  Colonoscopy was described as being difficult due to restrictive colon mobility..

## 2025-04-04 NOTE — HPI-PREVIOUS LABS
March 2024: Vitamin D 103 (H), serum iron 79, TIBC 297, 27% saturation  Labs July 2022: WBC 4.3, hemoglobin 12.9 g, MCV 91, platelets 225, BUN 15 creatinine 0.62, normal electrolytes, serum albumin 4.4, normal liver enzymes, hemoglobin A1c 5.6, normal lipid panel, TSH is normal, vitamin D 96, Labs 2018: Normal iron B12 folate levels, hemoglobin 11.3 g MCV 89, platelets 354